# Patient Record
Sex: MALE | Race: BLACK OR AFRICAN AMERICAN | Employment: FULL TIME | URBAN - METROPOLITAN AREA
[De-identification: names, ages, dates, MRNs, and addresses within clinical notes are randomized per-mention and may not be internally consistent; named-entity substitution may affect disease eponyms.]

---

## 2021-09-24 ENCOUNTER — HOSPITAL ENCOUNTER (EMERGENCY)
Age: 41
Discharge: HOME | End: 2021-09-24
Attending: EMERGENCY MEDICINE

## 2021-09-24 ENCOUNTER — APPOINTMENT (OUTPATIENT)
Dept: CT IMAGING | Age: 41
End: 2021-09-24
Attending: EMERGENCY MEDICINE

## 2021-09-24 VITALS
TEMPERATURE: 98.7 F | OXYGEN SATURATION: 97 % | BODY MASS INDEX: 35.55 KG/M2 | RESPIRATION RATE: 16 BRPM | DIASTOLIC BLOOD PRESSURE: 80 MMHG | SYSTOLIC BLOOD PRESSURE: 150 MMHG | HEIGHT: 69 IN | HEART RATE: 101 BPM | WEIGHT: 240 LBS

## 2021-09-24 DIAGNOSIS — K11.20 SIALOADENITIS: ICD-10-CM

## 2021-09-24 DIAGNOSIS — R59.0 CERVICAL LYMPHADENOPATHY: ICD-10-CM

## 2021-09-24 DIAGNOSIS — J02.9 PHARYNGITIS, UNSPECIFIED ETIOLOGY: Primary | ICD-10-CM

## 2021-09-24 LAB
ALBUMIN SERPL-MCNC: 3.1 G/DL (ref 3.5–5)
ALBUMIN/GLOB SERPL: 0.7 {RATIO} (ref 1.2–3.5)
ALP SERPL-CCNC: 86 U/L (ref 50–136)
ALT SERPL-CCNC: 40 U/L (ref 12–65)
ANION GAP SERPL CALC-SCNC: 8 MMOL/L (ref 7–16)
AST SERPL-CCNC: 41 U/L (ref 15–37)
BASOPHILS # BLD: 0 K/UL (ref 0–0.2)
BASOPHILS NFR BLD: 0 % (ref 0–2)
BILIRUB SERPL-MCNC: 0.4 MG/DL (ref 0.2–1.1)
BUN SERPL-MCNC: 11 MG/DL (ref 6–23)
CALCIUM SERPL-MCNC: 9.1 MG/DL (ref 8.3–10.4)
CHLORIDE SERPL-SCNC: 105 MMOL/L (ref 98–107)
CO2 SERPL-SCNC: 27 MMOL/L (ref 21–32)
CREAT SERPL-MCNC: 0.99 MG/DL (ref 0.8–1.5)
DIFFERENTIAL METHOD BLD: ABNORMAL
EOSINOPHIL # BLD: 0.2 K/UL (ref 0–0.8)
EOSINOPHIL NFR BLD: 2 % (ref 0.5–7.8)
ERYTHROCYTE [DISTWIDTH] IN BLOOD BY AUTOMATED COUNT: 13.2 % (ref 11.9–14.6)
GLOBULIN SER CALC-MCNC: 4.6 G/DL (ref 2.3–3.5)
GLUCOSE SERPL-MCNC: 158 MG/DL (ref 65–100)
HCT VFR BLD AUTO: 42.1 % (ref 41.1–50.3)
HETEROPH AB SER QL: NEGATIVE
HGB BLD-MCNC: 13.6 G/DL (ref 13.6–17.2)
IMM GRANULOCYTES # BLD AUTO: 0 K/UL (ref 0–0.5)
IMM GRANULOCYTES NFR BLD AUTO: 0 % (ref 0–5)
LIPASE SERPL-CCNC: 113 U/L (ref 73–393)
LYMPHOCYTES # BLD: 3.1 K/UL (ref 0.5–4.6)
LYMPHOCYTES NFR BLD: 33 % (ref 13–44)
MCH RBC QN AUTO: 29.4 PG (ref 26.1–32.9)
MCHC RBC AUTO-ENTMCNC: 32.3 G/DL (ref 31.4–35)
MCV RBC AUTO: 91.1 FL (ref 79.6–97.8)
MONOCYTES # BLD: 0.7 K/UL (ref 0.1–1.3)
MONOCYTES NFR BLD: 7 % (ref 4–12)
NEUTS SEG # BLD: 5.3 K/UL (ref 1.7–8.2)
NEUTS SEG NFR BLD: 57 % (ref 43–78)
NRBC # BLD: 0 K/UL (ref 0–0.2)
PLATELET # BLD AUTO: 409 K/UL (ref 150–450)
PMV BLD AUTO: 8.6 FL (ref 9.4–12.3)
POTASSIUM SERPL-SCNC: 3.9 MMOL/L (ref 3.5–5.1)
PROT SERPL-MCNC: 7.7 G/DL (ref 6.3–8.2)
RBC # BLD AUTO: 4.62 M/UL (ref 4.23–5.6)
SARS-COV-2, COV2: NORMAL
SODIUM SERPL-SCNC: 140 MMOL/L (ref 136–145)
STREP,MOLECULAR STRPM: NOT DETECTED
WBC # BLD AUTO: 9.3 K/UL (ref 4.3–11.1)

## 2021-09-24 PROCEDURE — 81003 URINALYSIS AUTO W/O SCOPE: CPT

## 2021-09-24 PROCEDURE — 80053 COMPREHEN METABOLIC PANEL: CPT

## 2021-09-24 PROCEDURE — 99283 EMERGENCY DEPT VISIT LOW MDM: CPT

## 2021-09-24 PROCEDURE — 70491 CT SOFT TISSUE NECK W/DYE: CPT

## 2021-09-24 PROCEDURE — 83690 ASSAY OF LIPASE: CPT

## 2021-09-24 PROCEDURE — 86308 HETEROPHILE ANTIBODY SCREEN: CPT

## 2021-09-24 PROCEDURE — U0004 COV-19 TEST NON-CDC HGH THRU: HCPCS

## 2021-09-24 PROCEDURE — 74011000258 HC RX REV CODE- 258: Performed by: EMERGENCY MEDICINE

## 2021-09-24 PROCEDURE — 85025 COMPLETE CBC W/AUTO DIFF WBC: CPT

## 2021-09-24 PROCEDURE — 74011000636 HC RX REV CODE- 636: Performed by: EMERGENCY MEDICINE

## 2021-09-24 PROCEDURE — 87651 STREP A DNA AMP PROBE: CPT

## 2021-09-24 RX ORDER — SODIUM CHLORIDE 0.9 % (FLUSH) 0.9 %
5-10 SYRINGE (ML) INJECTION AS NEEDED
Status: DISCONTINUED | OUTPATIENT
Start: 2021-09-24 | End: 2021-09-24 | Stop reason: HOSPADM

## 2021-09-24 RX ORDER — SODIUM CHLORIDE 0.9 % (FLUSH) 0.9 %
10 SYRINGE (ML) INJECTION
Status: COMPLETED | OUTPATIENT
Start: 2021-09-24 | End: 2021-09-24

## 2021-09-24 RX ORDER — SODIUM CHLORIDE 0.9 % (FLUSH) 0.9 %
5-10 SYRINGE (ML) INJECTION EVERY 8 HOURS
Status: DISCONTINUED | OUTPATIENT
Start: 2021-09-24 | End: 2021-09-24 | Stop reason: HOSPADM

## 2021-09-24 RX ORDER — AMOXICILLIN AND CLAVULANATE POTASSIUM 875; 125 MG/1; MG/1
1 TABLET, FILM COATED ORAL 2 TIMES DAILY
Qty: 20 TABLET | Refills: 0 | Status: SHIPPED | OUTPATIENT
Start: 2021-09-24

## 2021-09-24 RX ADMIN — IOPAMIDOL 80 ML: 755 INJECTION, SOLUTION INTRAVENOUS at 16:43

## 2021-09-24 RX ADMIN — SODIUM CHLORIDE 100 ML: 900 INJECTION, SOLUTION INTRAVENOUS at 16:44

## 2021-09-24 RX ADMIN — Medication 10 ML: at 16:44

## 2021-09-24 NOTE — ED PROVIDER NOTES
The history is provided by the patient. Sore Throat   This is a new problem. Episode onset: 10 to  14 days ago. The problem has been gradually worsening. There has been no fever. Associated symptoms include congestion, swollen glands and cough. Pertinent negatives include no shortness of breath. He has tried nothing for the symptoms. No past medical history on file. No past surgical history on file. No family history on file. Social History     Socioeconomic History    Marital status: SINGLE     Spouse name: Not on file    Number of children: Not on file    Years of education: Not on file    Highest education level: Not on file   Occupational History    Not on file   Tobacco Use    Smoking status: Current Every Day Smoker     Packs/day: 1.00   Substance and Sexual Activity    Alcohol use: Yes     Comment: socially    Drug use: No    Sexual activity: Not on file   Other Topics Concern    Not on file   Social History Narrative    Not on file     Social Determinants of Health     Financial Resource Strain:     Difficulty of Paying Living Expenses:    Food Insecurity:     Worried About Running Out of Food in the Last Year:     [address] in the Last Year:    Transportation Needs:     Lack of Transportation (Medical):  Lack of Transportation (Non-Medical):    Physical Activity:     Days of Exercise per Week:     Minutes of Exercise per Session:    Stress:     Feeling of Stress :    Social Connections:     Frequency of Communication with Friends and Family:     Frequency of Social Gatherings with Friends and Family:     Attends Alevism Services:     Active Member of Clubs or Organizations:     Attends Club or Organization Meetings:     Marital Status:    Intimate Partner Violence:     Fear of Current or Ex-Partner:     Emotionally Abused:     Physically Abused:     Sexually Abused: ALLERGIES: Patient has no known allergies.     Review of Systems Constitutional: Negative for chills and fever. HENT: Positive for congestion and sore throat. Negative for rhinorrhea. Respiratory: Positive for cough. Negative for shortness of breath. All other systems reviewed and are negative. Vitals:    09/24/21 1500   BP: (!) 150/80   Pulse: (!) 101   Resp: 16   Temp: 98.7 °F (37.1 °C)   SpO2: 97%   Weight: 108.9 kg (240 lb)   Height: 5' 9\" (1.753 m)            Physical Exam  Vitals and nursing note reviewed. Constitutional:       Appearance: He is well-developed. HENT:      Mouth/Throat:      Mouth: Mucous membranes are moist.      Pharynx: Uvula midline. Posterior oropharyngeal erythema present. No oropharyngeal exudate or uvula swelling. Tonsils: 2+ on the right. 2+ on the left. Comments: Ted sized nodule in the submental area with mild tenderness, no warmth or erythema. Dentition is nontender. There is no fullness or swelling in the sublingual area  Eyes:      Conjunctiva/sclera: Conjunctivae normal.      Pupils: Pupils are equal, round, and reactive to light. Cardiovascular:      Rate and Rhythm: Normal rate and regular rhythm. Heart sounds: Normal heart sounds. Pulmonary:      Effort: Pulmonary effort is normal.      Breath sounds: Normal breath sounds. Abdominal:      General: Bowel sounds are normal. There is no distension. Palpations: Abdomen is soft. Tenderness: There is no abdominal tenderness. There is no guarding or rebound. Musculoskeletal:         General: No tenderness. Normal range of motion. Cervical back: Neck supple. Lymphadenopathy:      Cervical: No cervical adenopathy. Skin:     General: Skin is warm and dry. Neurological:      Mental Status: He is alert and oriented to person, place, and time. MDM  Number of Diagnoses or Management Options  Diagnosis management comments: Submental swelling is chronic but he also states it is significantly worse over the last week and a half. Strep and mono test pending. Coronavirus test pending. I will do a CT scan soft tissue to evaluate for submental abscess/Bharathi's angina. There is no severe tenderness warmth or erythema and I suspect most of this is scar tissue. Thyroglossal duct cyst possible. 5:27 PM  No abscess or Bharathi's angina. We will start antibiotics and refer to ENT and general surgery for possible biopsy if nodes and symptoms do not resolve with antibiotics.        Amount and/or Complexity of Data Reviewed  Clinical lab tests: ordered and reviewed (Results for orders placed or performed during the hospital encounter of 09/24/21  -STREP, GROUP A, COLT  Specimen: Throat       Result                      Value             Ref Range           Strep, Molecular            Not detected                     -CBC WITH AUTOMATED DIFF       Result                      Value             Ref Range           WBC                         9.3               4.3 - 11.1 K*       RBC                         4.62              4.23 - 5.6 M*       HGB                         13.6              13.6 - 17.2 *       HCT                         42.1              41.1 - 50.3 %       MCV                         91.1              79.6 - 97.8 *       MCH                         29.4              26.1 - 32.9 *       MCHC                        32.3              31.4 - 35.0 *       RDW                         13.2              11.9 - 14.6 %       PLATELET                    409               150 - 450 K/*       MPV                         8.6 (L)           9.4 - 12.3 FL       ABSOLUTE NRBC               0.00              0.0 - 0.2 K/*       DF                          AUTOMATED                             NEUTROPHILS                 57                43 - 78 %           LYMPHOCYTES                 33                13 - 44 %           MONOCYTES                   7                 4.0 - 12.0 %        EOSINOPHILS                 2                 0.5 - 7.8 % BASOPHILS                   0                 0.0 - 2.0 %         IMMATURE GRANULOCYTES       0                 0.0 - 5.0 %         ABS. NEUTROPHILS            5.3               1.7 - 8.2 K/*       ABS. LYMPHOCYTES            3.1               0.5 - 4.6 K/*       ABS. MONOCYTES              0.7               0.1 - 1.3 K/*       ABS. EOSINOPHILS            0.2               0.0 - 0.8 K/*       ABS. BASOPHILS              0.0               0.0 - 0.2 K/*       ABS. IMM. GRANS.            0.0               0.0 - 0.5 K/*  -METABOLIC PANEL, COMPREHENSIVE       Result                      Value             Ref Range           Sodium                      140               136 - 145 mm*       Potassium                   3.9               3.5 - 5.1 mm*       Chloride                    105               98 - 107 mmo*       CO2                         27                21 - 32 mmol*       Anion gap                   8                 7 - 16 mmol/L       Glucose                     158 (H)           65 - 100 mg/*       BUN                         11                6 - 23 MG/DL        Creatinine                  0.99              0.8 - 1.5 MG*       GFR est AA                  >60               >60 ml/min/1*       GFR est non-AA              >60               >60 ml/min/1*       Calcium                     9.1               8.3 - 10.4 M*       Bilirubin, total            0.4               0.2 - 1.1 MG*       ALT (SGPT)                  40                12 - 65 U/L         AST (SGOT)                  41 (H)            15 - 37 U/L         Alk.  phosphatase            86                50 - 136 U/L        Protein, total              7.7               6.3 - 8.2 g/*       Albumin                     3.1 (L)           3.5 - 5.0 g/*       Globulin                    4.6 (H)           2.3 - 3.5 g/*       A-G Ratio                   0.7 (L)           1.2 - 3.5      -LIPASE       Result                      Value             Ref Range Lipase                      113               73 - 393 U/L   -MONONUCLEOSIS SCREEN       Result                      Value             Ref Range           Mononucleosis screen        Negative          NEG            -SARS-COV-2       Result                      Value             Ref Range           SARS-CoV-2                                                    Please find results under separate order  )  Tests in the radiology section of CPT®: ordered and reviewed (CT NECK SOFT TISSUE W CONT    Result Date: 9/24/2021  History: Submandibular swelling EXAM: CT soft tissue neck with IV contrast TECHNIQUE: Thin section axial CT images are obtained from the skull base through the lung apices after the uneventful administration of 80 cc Isovue-370. Radiation dose reduction techniques were used for this study. Our CT scanners use one or all of the following: Automated exposure control, adjustment of the mA and/or kV according to patient size, use of iterative reconstruction. No comparison FINDINGS: There is an enlarged submental lymph node present subjacent to a BB. This measures 1.7 x 2.2 cm (image 71). Additional prominent bilateral cervical lymph nodes are present. No definite asymmetry of the submandibular glands or parotid glands in terms of size. There is fat stranding seen about the right submandibular gland. There is normal opacification of the major intracranial vessels. No focal abnormal fluid collection present. The oropharynx, nasopharynx, and supraglottic larynx are normal. The vocal cords are symmetric. The thyroid gland is unremarkable. There is mild mucosal thickening of the maxillary sinuses. Evaluation of the upper abdomen demonstrates no definite abnormality. Bone window evaluation demonstrates no aggressive osseous lesions. 1. Fat stranding seen about the right submandibular gland. Findings could represent sialoadenitis.  2. There is cervical lymphadenopathy involving the submental region as well as the anterior jugular regions bilaterally.  Findings could represent reactive adenopathy, though additional etiologies are possible such as cervical lordosis or lymphoma.    )    Risk of Complications, Morbidity, and/or Mortality  Presenting problems: moderate  Diagnostic procedures: low           Procedures

## 2021-09-24 NOTE — ED TRIAGE NOTES
Pt states he got a sore throat about 1.5 weeks ago, throat feels swollen, it's reddened. Pt also has very swollen neck glands. Pt also has upper abdominal pain, nausea, some clear phlegm. Denies fever/chills, diarrhea, cough, shob, body aches. Denies known Covid exposure, states he had Covid in Dec 2020. Pt not vaccinated.

## 2021-09-24 NOTE — ED NOTES
I have reviewed discharge instructions with the patient. The patient verbalized understanding. Patient left ED via Discharge Method: ambulatory to Home with self. Opportunity for questions and clarification provided. Patient given 1 scripts. To continue your aftercare when you leave the hospital, you may receive an automated call from our care team to check in on how you are doing. This is a free service and part of our promise to provide the best care and service to meet your aftercare needs.  If you have questions, or wish to unsubscribe from this service please call [phone]. Thank you for Choosing our [facility] Emergency Department.

## 2021-09-24 NOTE — DISCHARGE INSTRUCTIONS
Antibiotics as prescribed. Follow-up with your primary care doctor or the ear nose and throat specialist in 10 to 14 days if not improving for reevaluation and possible biopsy of the swollen area and lymph nodes to exclude lymphoma/cancer. Use sour lemon drops to promote salivation several times a day to help with the swelling under your chin which may be a blocked salivary duct.

## 2021-09-25 LAB — SARS COV-2, XPGCVT: NEGATIVE
